# Patient Record
Sex: FEMALE | NOT HISPANIC OR LATINO | URBAN - METROPOLITAN AREA
[De-identification: names, ages, dates, MRNs, and addresses within clinical notes are randomized per-mention and may not be internally consistent; named-entity substitution may affect disease eponyms.]

---

## 2017-10-18 ENCOUNTER — EMERGENCY (EMERGENCY)
Facility: HOSPITAL | Age: 57
LOS: 0 days | Discharge: HOME | End: 2017-10-18
Attending: EMERGENCY MEDICINE | Admitting: EMERGENCY MEDICINE
Payer: MEDICAID

## 2017-10-18 VITALS
TEMPERATURE: 98 F | HEART RATE: 81 BPM | DIASTOLIC BLOOD PRESSURE: 82 MMHG | RESPIRATION RATE: 18 BRPM | SYSTOLIC BLOOD PRESSURE: 144 MMHG | OXYGEN SATURATION: 98 %

## 2017-10-18 VITALS — WEIGHT: 145.06 LBS | HEIGHT: 58 IN

## 2017-10-18 DIAGNOSIS — M54.9 DORSALGIA, UNSPECIFIED: ICD-10-CM

## 2017-10-18 PROCEDURE — 99283 EMERGENCY DEPT VISIT LOW MDM: CPT

## 2017-10-18 RX ORDER — KETOROLAC TROMETHAMINE 30 MG/ML
1 SYRINGE (ML) INJECTION
Qty: 9 | Refills: 0 | OUTPATIENT
Start: 2017-10-18 | End: 2017-10-21

## 2017-10-18 RX ORDER — KETOROLAC TROMETHAMINE 30 MG/ML
30 SYRINGE (ML) INJECTION ONCE
Qty: 0 | Refills: 0 | Status: DISCONTINUED | OUTPATIENT
Start: 2017-10-18 | End: 2017-10-18

## 2017-10-18 RX ADMIN — Medication 30 MILLIGRAM(S): at 12:41

## 2017-10-18 NOTE — ED STATDOCS - OBJECTIVE STATEMENT
56 y/o F w/ PMHx of DM, presents to ED c/o back pain x1 month and sciatic pain. Pt has chronic back pain for years and arthritis in her left knee. No saddle anesthesia, no bowel or urinary incontinence. States she has been taking Tylenol and Advil with some relief. Also c/o cough. Denies fevers, SOB, difficulty urinating or any other acute complaints.

## 2017-10-18 NOTE — ED ADULT NURSE NOTE - OBJECTIVE STATEMENT
Pt c/o lower back pain x1 month. States the pain hasn't gone away which is why she came in to be evaluated today. States she takes tylenol for the pain with some relief.

## 2017-10-18 NOTE — ED ADULT NURSE NOTE - CHIEF COMPLAINT QUOTE
Pt presents to ED c/o lower back pain and left leg pain for 1 month. Pt denies bladder/bowel dysfunction.

## 2017-10-18 NOTE — ED STATDOCS - NS_ ATTENDINGSCRIBEDETAILS _ED_A_ED_FT
I, Maulik Cheung MD,  performed the initial face to face bedside interview with this patient regarding history of present illness, review of symptoms and relevant past medical, social and family history.  I completed an independent physical examination.    The history, relevant review of systems, past medical and surgical history, medical decision making, and physical examination was documented by the scribe in my presence and I attest to the accuracy of the documentation.

## 2017-10-18 NOTE — ED STATDOCS - MUSCULOSKELETAL, MLM
left paraspinal tenderness and anterior tibial knee pain. No saddle anesthesia, neurovascularly intact.

## 2020-01-15 NOTE — ED ADULT NURSE NOTE - [id number]_ED_A_ED
SUBJECTIVE:    Patient ID: Saige Leyva is a 59 y.o. female. HPI:   Patient presents today with a two-week history of cough. She states that she is not coughing up anything and it is more of a dry cough. She does have a history of getting bronchitis a couple times a year. She is not a smoker and is never been a smoker. She states that she has never been diagnosed with COPD or asthma. She states that she has not been taking anything at home. She states that she is not running fever and is not had chills. She denies any sinus pressure. She has had some congestion. She denies any sore throat or ear pain. Past Medical History:   Diagnosis Date    Diabetes mellitus ([facility] [address]     History of blood transfusion     1976 post childbirth    Hypertension     Thyroid disease       Current Outpatient Medications   Medication Sig Dispense Refill    TRULICITY 2.50 WA/5.1YT SOPN INJECT 0.75 MG INTO THE SKIN ONCE A WEEK 4 pen 3    metFORMIN (GLUCOPHAGE) 1000 MG tablet TAKE 1 TABLET BY MOUTH TWICE DAILY WITH MEALS FOR DIABETES 60 tablet 5    levothyroxine (SYNTHROID) 150 MCG tablet TAKE 1 TABLET BY MOUTH ONCE DAILY 30 tablet 5    sertraline (ZOLOFT) 100 MG tablet TAKE 1 TABLET BY MOUTH ONCE DAILY FOR DEPRESSION 30 tablet 5    amLODIPine (NORVASC) 5 MG tablet Take 1 tablet by mouth daily 30 tablet 3    Blood Glucose Monitoring Suppl (FREESTYLE LITE) ROBEL USE TO CHECK GLUCOSE THREE TIMES DAILY BEFORE MEAL(S)  0    ondansetron (ZOFRAN) 4 MG tablet 1 PO BID prn nausea 60 tablet 1    blood glucose monitor strips Test 3 times a day & as needed for symptoms of irregular blood glucose.  Freestyle lite 100 strip 3    glucose monitoring kit (FREESTYLE) monitoring kit 1 kit by Does not apply route 3 times daily (before meals) Dx E11.8 1 kit 0    busPIRone (BUSPAR) 10 MG tablet Take 1 tablet by mouth 2 times daily 60 tablet 5    glucose blood VI test strips (ASCENSIA AUTODISC VI;ONE TOUCH ULTRA TEST VI) strip Test blood sugar 3 times a day before meals and as needed 100 each 3    guaiFENesin 400 MG tablet Take 400 mg by mouth 2 times daily       No current facility-administered medications for this visit. Allergies   Allergen Reactions    Pcn [Penicillins] Rash       Review of Systems   Constitutional: Negative for activity change, appetite change, chills and fever. HENT: Positive for congestion. Negative for nosebleeds, postnasal drip, rhinorrhea and sinus pressure. Eyes: Negative for discharge, redness and itching. Respiratory: Positive for cough. Negative for chest tightness and wheezing. Cardiovascular: Negative for chest pain. Gastrointestinal: Negative for abdominal pain, diarrhea, nausea and vomiting. Genitourinary: Negative for decreased urine volume and difficulty urinating. Skin: Negative for color change and rash. Neurological: Negative for dizziness and headaches. Hematological: Does not bruise/bleed easily. OBJECTIVE:     Physical Exam  Constitutional:       Appearance: She is well-developed. HENT:      Head: Normocephalic and atraumatic. Right Ear: Tympanic membrane normal.      Left Ear: Tympanic membrane normal.      Nose: Mucosal edema and rhinorrhea present. Right Sinus: Maxillary sinus tenderness and frontal sinus tenderness present. Left Sinus: Maxillary sinus tenderness and frontal sinus tenderness present. Mouth/Throat:      Pharynx: Uvula midline. No oropharyngeal exudate. Eyes:      Conjunctiva/sclera: Conjunctivae normal.      Pupils: Pupils are equal, round, and reactive to light. Neck:      Musculoskeletal: Normal range of motion and neck supple. Cardiovascular:      Rate and Rhythm: Normal rate and regular rhythm. Pulmonary:      Effort: Pulmonary effort is normal.      Breath sounds: Wheezing present. Skin:     General: Skin is warm and dry. Findings: No rash.    Neurological:      Mental Status: She is alert and oriented to No

## 2020-06-22 PROBLEM — E11.9 TYPE 2 DIABETES MELLITUS WITHOUT COMPLICATIONS: Chronic | Status: ACTIVE | Noted: 2017-10-27

## 2020-06-23 ENCOUNTER — APPOINTMENT (OUTPATIENT)
Dept: DERMATOLOGY | Facility: CLINIC | Age: 60
End: 2020-06-23
Payer: MEDICAID

## 2020-06-23 VITALS — HEIGHT: 58 IN | BODY MASS INDEX: 27.29 KG/M2 | WEIGHT: 130 LBS

## 2020-06-23 DIAGNOSIS — Z78.9 OTHER SPECIFIED HEALTH STATUS: ICD-10-CM

## 2020-06-23 DIAGNOSIS — L65.9 NONSCARRING HAIR LOSS, UNSPECIFIED: ICD-10-CM

## 2020-06-23 DIAGNOSIS — L81.0 POSTINFLAMMATORY HYPERPIGMENTATION: ICD-10-CM

## 2020-06-23 DIAGNOSIS — L20.9 ATOPIC DERMATITIS, UNSPECIFIED: ICD-10-CM

## 2020-06-23 PROCEDURE — 99203 OFFICE O/P NEW LOW 30 MIN: CPT

## 2020-06-23 RX ORDER — LEVOTHYROXINE SODIUM 137 UG/1
TABLET ORAL
Refills: 0 | Status: ACTIVE | COMMUNITY

## 2020-06-23 RX ORDER — SITAGLIPTIN AND METFORMIN HYDROCHLORIDE 50; 1000 MG/1; MG/1
TABLET, FILM COATED ORAL
Refills: 0 | Status: ACTIVE | COMMUNITY

## 2020-06-23 RX ORDER — CHROMIUM 200 MCG
TABLET ORAL
Refills: 0 | Status: ACTIVE | COMMUNITY

## 2020-06-23 RX ORDER — TRIAMCINOLONE ACETONIDE 1 MG/G
0.1 CREAM TOPICAL
Qty: 1 | Refills: 3 | Status: ACTIVE | COMMUNITY
Start: 2020-06-23 | End: 1900-01-01

## 2020-06-23 RX ORDER — GLIMEPIRIDE 4 MG/1
4 TABLET ORAL
Refills: 0 | Status: ACTIVE | COMMUNITY

## 2020-06-23 RX ORDER — LOSARTAN POTASSIUM 100 MG/1
TABLET, FILM COATED ORAL
Refills: 0 | Status: ACTIVE | COMMUNITY

## 2020-06-23 NOTE — ASSESSMENT
[FreeTextEntry1] : Alopecia\par Check labs.\par education.\par \par Atopic dermatitis with P.I. hyperpigmentation.\par TAC 0.1% cream bid prn.\par Eucerin eczema relief - to be used bid continuously.\par Education.

## 2020-06-23 NOTE — PHYSICAL EXAM
[Alert] : alert [Oriented x 3] : ~L oriented x 3 [Well Nourished] : well nourished [Eyelids] : Eyelids [Ears] : Ears [Lips] : Lips [Neck] : Neck [FreeTextEntry3] : Scalp without problem.  No hairs found with gentle hair pluck.\par Mild thyromegaly.\par \par Hyperpigmentation, of the bilateral forearms and distal upper arm, with some eczematous patches, and excoriations, including the antecubital fossa.\par back with marked hyperpigmentation, some excoriated papules, of the upper and lower back, sparing the mid-back region.

## 2020-06-23 NOTE — HISTORY OF PRESENT ILLNESS
[FreeTextEntry1] : Hair loss and dark patches on forearms and back. [de-identified] : Patient with long hx of eczema, with itching and darkening of the UE's and back over the past 3+ months.  No self tx.\par Also with hair loss over the past 3 months diffusely on the scalp.  Notes significant hairs being lost, found in brush and when washing hair.

## 2020-06-23 NOTE — REVIEW OF SYSTEMS
[Fatigue] : no fatigue [Night Sweats] : no night sweats [Weight loss] : no weight loss [Negative] : Musculoskeletal

## 2021-01-01 NOTE — ED ADULT TRIAGE NOTE - HEIGHT IN FEET
1910- Bedside and Verbal shift change report given to Roslyn Hernandez RN (oncoming nurse) by Selam Portillo RN (offgoing nurse). Report included the following information SBAR, Intake/Output, MAR and Recent Results. 0740- Bedside and Verbal shift change report given to Selam Portillo RN (oncoming nurse) by Roslyn Hernandez RN (offgoing nurse). Report included the following information SBAR, Intake/Output, MAR and Recent Results. 4

## 2023-07-18 ENCOUNTER — OFFICE (OUTPATIENT)
Dept: URBAN - METROPOLITAN AREA CLINIC 6 | Facility: CLINIC | Age: 63
Setting detail: OPHTHALMOLOGY
End: 2023-07-18
Payer: COMMERCIAL

## 2023-07-18 DIAGNOSIS — E11.9: ICD-10-CM

## 2023-07-18 DIAGNOSIS — H40.1131: ICD-10-CM

## 2023-07-18 DIAGNOSIS — H25.13: ICD-10-CM

## 2023-07-18 DIAGNOSIS — H40.033: ICD-10-CM

## 2023-07-18 PROCEDURE — 92014 COMPRE OPH EXAM EST PT 1/>: CPT | Performed by: OPHTHALMOLOGY

## 2023-07-18 PROCEDURE — 92250 FUNDUS PHOTOGRAPHY W/I&R: CPT | Performed by: OPHTHALMOLOGY

## 2023-07-18 ASSESSMENT — VISUAL ACUITY
OD_BCVA: 20/25-1
OS_BCVA: 20/20

## 2023-07-18 ASSESSMENT — KERATOMETRY
OD_K1POWER_DIOPTERS: 44.75
OS_K1POWER_DIOPTERS: 44.25
OD_AXISANGLE_DEGREES: 99
OS_AXISANGLE_DEGREES: 90
METHOD_AUTO_MANUAL: AUTO
OS_K2POWER_DIOPTERS: 45.00
OD_K2POWER_DIOPTERS: 45.50

## 2023-07-18 ASSESSMENT — PACHYMETRY
OS_CT_UM: 561
OD_CT_CORRECTION: -1
OS_CT_CORRECTION: -1
OD_CT_UM: 554

## 2023-07-18 ASSESSMENT — REFRACTION_AUTOREFRACTION
OD_AXIS: 40
OS_CYLINDER: -0.50
OD_SPHERE: +0.25
OS_SPHERE: +0.75
OD_CYLINDER: -0.75
OS_AXIS: 10

## 2023-07-18 ASSESSMENT — TONOMETRY
OS_IOP_MMHG: 21
OD_IOP_MMHG: 21

## 2023-07-18 ASSESSMENT — SPHEQUIV_DERIVED
OS_SPHEQUIV: 0.5
OD_SPHEQUIV: -0.125

## 2023-07-18 ASSESSMENT — CONFRONTATIONAL VISUAL FIELD TEST (CVF)
OD_FINDINGS: FULL
OS_FINDINGS: FULL

## 2023-07-18 ASSESSMENT — AXIALLENGTH_DERIVED
OD_AL: 23.0562
OS_AL: 22.9992

## 2023-07-18 ASSESSMENT — LID POSITION - COMMENTS: OS_COMMENTS: INCISION INTACT

## 2024-04-30 ENCOUNTER — OFFICE (OUTPATIENT)
Dept: URBAN - METROPOLITAN AREA CLINIC 6 | Facility: CLINIC | Age: 64
Setting detail: OPHTHALMOLOGY
End: 2024-04-30
Payer: COMMERCIAL

## 2024-04-30 DIAGNOSIS — H40.1131: ICD-10-CM

## 2024-04-30 PROCEDURE — 92012 INTRM OPH EXAM EST PATIENT: CPT | Performed by: OPHTHALMOLOGY

## 2024-04-30 PROCEDURE — 92083 EXTENDED VISUAL FIELD XM: CPT | Performed by: OPHTHALMOLOGY

## 2024-04-30 PROCEDURE — 92133 CPTRZD OPH DX IMG PST SGM ON: CPT | Performed by: OPHTHALMOLOGY

## 2024-04-30 ASSESSMENT — LID POSITION - COMMENTS: OS_COMMENTS: INCISION INTACT

## 2024-06-24 ENCOUNTER — OFFICE (OUTPATIENT)
Dept: URBAN - METROPOLITAN AREA CLINIC 109 | Facility: CLINIC | Age: 64
Setting detail: OPHTHALMOLOGY
End: 2024-06-24
Payer: COMMERCIAL

## 2024-06-24 VITALS — HEIGHT: 55 IN

## 2024-06-24 DIAGNOSIS — H16.223: ICD-10-CM

## 2024-06-24 DIAGNOSIS — H25.13: ICD-10-CM

## 2024-06-24 DIAGNOSIS — H40.033: ICD-10-CM

## 2024-06-24 PROCEDURE — 92020 GONIOSCOPY: CPT | Performed by: OPHTHALMOLOGY

## 2024-06-24 PROCEDURE — 99213 OFFICE O/P EST LOW 20 MIN: CPT | Performed by: OPHTHALMOLOGY

## 2024-06-24 ASSESSMENT — CONFRONTATIONAL VISUAL FIELD TEST (CVF)
OD_FINDINGS: FULL
OS_FINDINGS: FULL

## 2024-06-24 ASSESSMENT — LID POSITION - COMMENTS: OS_COMMENTS: INCISION INTACT
